# Patient Record
Sex: MALE | Race: WHITE | NOT HISPANIC OR LATINO | URBAN - NONMETROPOLITAN AREA
[De-identification: names, ages, dates, MRNs, and addresses within clinical notes are randomized per-mention and may not be internally consistent; named-entity substitution may affect disease eponyms.]

---

## 2017-08-22 PROBLEM — Z96.1: Noted: 2017-08-22

## 2017-08-22 PROBLEM — H52.4: Noted: 2017-08-22

## 2017-08-22 PROBLEM — E11.9: Noted: 2017-08-22

## 2017-08-22 PROBLEM — H53.002: Noted: 2017-08-22

## 2017-08-22 PROBLEM — H53.023: Noted: 2017-08-22

## 2021-04-21 ENCOUNTER — IMPORTED ENCOUNTER (OUTPATIENT)
Dept: URBAN - NONMETROPOLITAN AREA CLINIC 1 | Facility: CLINIC | Age: 81
End: 2021-04-21

## 2021-04-21 PROCEDURE — 92015 DETERMINE REFRACTIVE STATE: CPT

## 2021-04-21 PROCEDURE — 99204 OFFICE O/P NEW MOD 45 MIN: CPT

## 2021-04-21 NOTE — PATIENT DISCUSSION
Pseudophakia OUDiscussed diagnosis in detail with patientBoth intraocular implants in place and stableContinue to monitorNIDDM sans RetinopathyDiscussed diagnosis with patientDiscussed the risk of diabetic damage of the retina with potential vision loss and the importance of routine follow-upEmphasized strict blood sugar controlContinue to monitorAmblyopia ODDiscussed diagnosis in detail with patientContinue to monitorPresbyopia OUDiscussed refractive status in detail with patientNew glasses Rx given today

## 2022-04-09 ASSESSMENT — TONOMETRY
OS_IOP_MMHG: 16
OD_IOP_MMHG: 16

## 2022-04-09 ASSESSMENT — VISUAL ACUITY
OS_SC: 20/200
OD_SC: 20/25

## 2024-09-10 ENCOUNTER — NEW PATIENT (OUTPATIENT)
Dept: RURAL CLINIC 3 | Facility: CLINIC | Age: 84
End: 2024-09-10

## 2024-09-10 DIAGNOSIS — E11.9: ICD-10-CM

## 2024-09-10 DIAGNOSIS — H52.4: ICD-10-CM

## 2024-09-10 PROCEDURE — 92015 DETERMINE REFRACTIVE STATE: CPT

## 2024-09-10 PROCEDURE — 92004 COMPRE OPH EXAM NEW PT 1/>: CPT
